# Patient Record
Sex: FEMALE | Race: OTHER | Employment: UNEMPLOYED | URBAN - METROPOLITAN AREA
[De-identification: names, ages, dates, MRNs, and addresses within clinical notes are randomized per-mention and may not be internally consistent; named-entity substitution may affect disease eponyms.]

---

## 2017-04-20 ENCOUNTER — HOSPITAL ENCOUNTER (OUTPATIENT)
Dept: WOMENS IMAGING | Age: 42
Discharge: HOME | End: 2017-04-20

## 2017-04-20 DIAGNOSIS — Z13.9 SCREENING: ICD-10-CM

## 2017-04-20 PROCEDURE — G0202 SCR MAMMO BI INCL CAD: HCPCS

## 2018-05-16 ENCOUNTER — HOSPITAL ENCOUNTER (OUTPATIENT)
Dept: WOMENS IMAGING | Age: 43
Discharge: HOME | End: 2018-05-18

## 2018-05-16 DIAGNOSIS — Z12.31 VISIT FOR SCREENING MAMMOGRAM: ICD-10-CM

## 2018-05-16 PROCEDURE — 77067 SCR MAMMO BI INCL CAD: CPT

## 2021-12-31 PROCEDURE — 96372 THER/PROPH/DIAG INJ SC/IM: CPT

## 2021-12-31 PROCEDURE — 99283 EMERGENCY DEPT VISIT LOW MDM: CPT

## 2021-12-31 PROCEDURE — 93005 ELECTROCARDIOGRAM TRACING: CPT | Performed by: EMERGENCY MEDICINE

## 2021-12-31 ASSESSMENT — PAIN SCALES - GENERAL: PAINLEVEL_OUTOF10: 8

## 2021-12-31 ASSESSMENT — PAIN DESCRIPTION - ORIENTATION: ORIENTATION: RIGHT

## 2021-12-31 ASSESSMENT — PAIN DESCRIPTION - PAIN TYPE: TYPE: ACUTE PAIN

## 2022-01-01 ENCOUNTER — APPOINTMENT (OUTPATIENT)
Dept: GENERAL RADIOLOGY | Age: 47
End: 2022-01-01
Payer: COMMERCIAL

## 2022-01-01 ENCOUNTER — HOSPITAL ENCOUNTER (EMERGENCY)
Age: 47
Discharge: HOME | End: 2022-01-01
Attending: EMERGENCY MEDICINE
Payer: COMMERCIAL

## 2022-01-01 VITALS
HEIGHT: 64 IN | TEMPERATURE: 97.3 F | HEART RATE: 72 BPM | WEIGHT: 155 LBS | DIASTOLIC BLOOD PRESSURE: 77 MMHG | SYSTOLIC BLOOD PRESSURE: 125 MMHG | RESPIRATION RATE: 16 BRPM | BODY MASS INDEX: 26.46 KG/M2 | OXYGEN SATURATION: 100 %

## 2022-01-01 DIAGNOSIS — U07.1 COVID: Primary | ICD-10-CM

## 2022-01-01 PROCEDURE — 6360000002 HC RX W HCPCS: Performed by: EMERGENCY MEDICINE

## 2022-01-01 PROCEDURE — 6370000000 HC RX 637 (ALT 250 FOR IP): Performed by: EMERGENCY MEDICINE

## 2022-01-01 PROCEDURE — 71045 X-RAY EXAM CHEST 1 VIEW: CPT

## 2022-01-01 RX ORDER — BENZONATATE 100 MG/1
100 CAPSULE ORAL ONCE
Status: COMPLETED | OUTPATIENT
Start: 2022-01-01 | End: 2022-01-01

## 2022-01-01 RX ORDER — BENZONATATE 100 MG/1
100 CAPSULE ORAL 2 TIMES DAILY PRN
Qty: 20 CAPSULE | Refills: 0 | Status: SHIPPED | OUTPATIENT
Start: 2022-01-01 | End: 2022-01-08

## 2022-01-01 RX ORDER — KETOROLAC TROMETHAMINE 30 MG/ML
30 INJECTION, SOLUTION INTRAMUSCULAR; INTRAVENOUS ONCE
Status: COMPLETED | OUTPATIENT
Start: 2022-01-01 | End: 2022-01-01

## 2022-01-01 RX ORDER — FLUTICASONE PROPIONATE 50 MCG
1 SPRAY, SUSPENSION (ML) NASAL DAILY
Qty: 16 G | Refills: 0 | Status: SHIPPED | OUTPATIENT
Start: 2022-01-01 | End: 2022-07-12

## 2022-01-01 RX ADMIN — KETOROLAC TROMETHAMINE 30 MG: 30 INJECTION, SOLUTION INTRAMUSCULAR; INTRAVENOUS at 01:48

## 2022-01-01 RX ADMIN — BENZONATATE 100 MG: 100 CAPSULE ORAL at 01:47

## 2022-01-01 ASSESSMENT — ENCOUNTER SYMPTOMS
VOMITING: 0
DIARRHEA: 0
SHORTNESS OF BREATH: 0
BACK PAIN: 1
ABDOMINAL PAIN: 0
COUGH: 1

## 2022-01-01 ASSESSMENT — PAIN SCALES - GENERAL
PAINLEVEL_OUTOF10: 6
PAINLEVEL_OUTOF10: 6

## 2022-01-01 NOTE — ED TRIAGE NOTES
Mode of arrival (squad #, walk in, police, etc) : walk in        Chief complaint(s): Rt sided head/shoulder/arm pain and cough. Possible dental pain        Arrival Note (brief scenario, treatment PTA, etc). : Pt has bad tooth and now has pain into RT shoulder and head from tooth and cough from covid        C= \"Have you ever felt that you should Cut down on your drinking? \"no  A= \"Have people Annoyed you by criticizing your drinking? \" no  G= \"Have you ever felt bad or Guilty about your drinking? \" no  E= \"Have you ever had a drink as an Eye-opener first thing in the morning to steady your nerves or to help a hangover? \" no    Deferred []      Reason for deferring: na     *If yes to two or more: probable alcohol abuse. *

## 2022-01-01 NOTE — ED PROVIDER NOTES
EMERGENCY DEPARTMENT ENCOUNTER    Pt Name: Palma Morrow  MRN: [id number]  [place] 1975  Date of evaluation: 1/1/22  CHIEF COMPLAINT       Chief Complaint   Patient presents with    Positive For Covid-19     HISTORY OF PRESENT ILLNESS   HPI     This is a 76-year-old female comes in today. The patient understands most English however her daughter helped with communication patient is [place]olan-speaking. She tested positive for coronavirus on 16 December she has had persistent cough she is now complaining of right-sided ear pain as well as shoulder pain generalized body aches and cough without sputum production she denies any chest pain no shortness of breath she has been alternating Tylenol Motrin without any improvement of her symptoms she also is supposed to see the dentist in about a week and is having right-sided jaw and tooth pain. REVIEW OF SYSTEMS     Review of Systems   Constitutional: Negative for fever. HENT: Positive for congestion and dental problem. Respiratory: Positive for cough. Negative for shortness of breath. Cardiovascular: Negative for chest pain. Gastrointestinal: Negative for abdominal pain, diarrhea and vomiting. Genitourinary: Negative for dysuria. Musculoskeletal: Positive for back pain and myalgias. Skin: Negative for rash. Neurological: Negative for headaches. All other systems reviewed and are negative. PASTMEDICAL HISTORY   History reviewed. No pertinent past medical history. SURGICAL HISTORY     History reviewed. No pertinent surgical history. CURRENT MEDICATIONS       Previous Medications    ONDANSETRON (ZOFRAN) 4 MG TABLET    Take 1 tablet by mouth every 8 hours as needed for Nausea     ALLERGIES     has No Known Allergies. FAMILY HISTORY     has no family status information on file.       SOCIAL HISTORY       Social History     Tobacco Use    Smoking status: Not on file    Smokeless tobacco: Not on file   Substance Use Topics    Alcohol use: Not on file    Drug use: Not on file     PHYSICAL EXAM     INITIAL VITALS: /77   Pulse 72   Temp 97.3 °F (36.3 °C) (Tympanic)   Resp 18   Ht 5' 4\" (1.626 m)   Wt 155 lb (70.3 kg)   SpO2 100%   BMI 26.61 kg/m²    Physical Exam  Vitals and nursing note reviewed. Constitutional:       General: She is not in acute distress. Appearance: She is well-developed. HENT:      Head: Normocephalic and atraumatic. Right Ear: Tympanic membrane normal.      Left Ear: Tympanic membrane normal.      Nose: Congestion present. Mouth/Throat:      Mouth: Mucous membranes are moist.      Comments: Dental caries on the right lower molar without any acute tenderness to palpation of the molars and no surrounding swelling no submandibular swelling no cervical lymphadenopathy  Eyes:      Conjunctiva/sclera: Conjunctivae normal.   Cardiovascular:      Rate and Rhythm: Normal rate and regular rhythm. Heart sounds: No murmur heard. No friction rub. Pulmonary:      Comments: Staccato cough while in the room no wheezing no rhonchi equal breath sounds bilaterally  Abdominal:      General: There is no distension. Palpations: Abdomen is soft. Tenderness: There is no abdominal tenderness. There is no right CVA tenderness, left CVA tenderness, guarding or rebound. Musculoskeletal:      Cervical back: Neck supple. Comments: No midline spinal tenderness to palpation   Skin:     General: Skin is warm and dry. Capillary Refill: Capillary refill takes less than 2 seconds. Neurological:      Mental Status: She is alert. DIAGNOSTIC RESULTS   RADIOLOGY:All plain film, CT, MRI, and formal ultrasound images (except ED bedside ultrasound) are read by the radiologist, see reports below, unless otherwisenoted in MDM or here. XR CHEST PORTABLE   Final Result   No evidence of acute cardiopulmonary disease.                EMERGENCY DEPARTMENTCOURSE:   Differential diagnosis includes viral syndrome, pneumonia, ear infection clinically the patient does not have otitis media she may be having right-sided ear pain from her nasal congestion we will give her Flonase. Looking at the teeth the patient does have some dental caries however there is no erythema there is no abscess there is no swelling she already has an appointment with her dentist in about a week do not believe she requires acute antibiotics at this time we will give her Toradol injection for her body aches and given the persistence of her symptoms we will also check a chest x-ray to rule out pneumonia her pulse ox was 100% I personally had the patient march in place and her pulse ox remained above 97%    1:32 AM EST  CXR is negative for pneumonia, she will be discharged with flonase, tessalon, and dental follow up.           Vitals:    Vitals:    21 2100   BP: 125/77   Pulse: 72   Resp: 18   Temp: 97.3 °F (36.3 °C)   TempSrc: Tympanic   SpO2: 100%   Weight: 155 lb (70.3 kg)   Height: 5' 4\" (1.626 m)       The patient was given the following medications while in the emergency department:  Orders Placed This Encounter   Medications    ketorolac (TORADOL) injection 30 mg    benzonatate (TESSALON) capsule 100 mg    fluticasone (FLONASE) 50 MCG/ACT nasal spray     Si spray by Each Nostril route daily     Dispense:  16 g     Refill:  0    benzonatate (TESSALON) 100 MG capsule     Sig: Take 1 capsule by mouth 2 times daily as needed for Cough     Dispense:  20 capsule     Refill:  0         FINAL IMPRESSION      1. COVID         DISPOSITION/PLAN   DISPOSITION Decision To Discharge 2022 01:30:06 AM      PATIENT REFERRED TO:  TASHA Montalvo CNP  [facility] [address]  [phone]    Schedule an appointment as soon as possible for a visit       [facility] ED  [place] [id number]  [phone]    If symptoms worsen    DISCHARGE MEDICATIONS:  New Prescriptions    BENZONATATE (TESSALON) 100 MG CAPSULE    Take 1 capsule by mouth 2 times daily as needed for Cough    FLUTICASONE (FLONASE) 50 MCG/ACT NASAL SPRAY    1 spray by Each Nostril route daily     Celestina Haynes MD  Attending Emergency Physician    This charting supersedes any ED resident or staff charting and was written using speech recognition Danni Lopes MD  01/01/22 [id number]

## 2022-01-03 ENCOUNTER — CARE COORDINATION (OUTPATIENT)
Dept: CARE COORDINATION | Age: 47
End: 2022-01-03

## 2022-01-03 LAB
EKG ATRIAL RATE: 75 BPM
EKG P AXIS: 20 DEGREES
EKG P-R INTERVAL: 140 MS
EKG Q-T INTERVAL: 406 MS
EKG QRS DURATION: 66 MS
EKG QTC CALCULATION (BAZETT): 453 MS
EKG R AXIS: 45 DEGREES
EKG T AXIS: 54 DEGREES
EKG VENTRICULAR RATE: 75 BPM

## 2022-01-03 NOTE — CARE COORDINATION
Patient contacted regarding recent visit for viral symptoms. Call within 2 business days of discharge: Yes    Medical Assistant contacted the patient by telephone to perform follow-up call. Verified name and  with patient as identifiers. Provided introduction to self, and reason for call due to viral symptoms of infection and/or exposure to COVID-19. Discussed COVID-19 related testing which was not done at this time. Test results were not done. Patient informed of results, if available? n/a. Patient presented to emergency department/flu clinic with complaints of viral symptoms/exposure to COVID. Patient reports symptoms are improving. Due to no new or worsening symptoms the RN CTN/ACM was not notified for escalation. This author reviewed discharge instructions, medical action plan and red flags such as increased shortness of breath, increasing fever, worsening cough or chest pain with patient who verbalized understanding. Discussed exposure protocols and quarantine with CDC Guidelines What To Do If You Are Sick    Patient was given an opportunity for questions and concerns. The patient agrees to contact their healthcare provider for questions related to their healthcare. Author provided contact information for future reference.

## 2022-06-15 ENCOUNTER — HOSPITAL ENCOUNTER (OUTPATIENT)
Dept: GENERAL RADIOLOGY | Age: 47
Discharge: HOME | End: 2022-06-17
Payer: COMMERCIAL

## 2022-06-15 ENCOUNTER — HOSPITAL ENCOUNTER (OUTPATIENT)
Age: 47
Discharge: HOME | End: 2022-06-15
Payer: COMMERCIAL

## 2022-06-15 ENCOUNTER — HOSPITAL ENCOUNTER (OUTPATIENT)
Age: 47
Discharge: HOME | End: 2022-06-17
Payer: COMMERCIAL

## 2022-06-15 DIAGNOSIS — R04.2 HEMOPTYSIS: ICD-10-CM

## 2022-06-15 LAB
ABSOLUTE EOS #: 0.2 K/UL (ref 0–0.4)
ABSOLUTE LYMPH #: 2.5 K/UL (ref 1–4.8)
ABSOLUTE MONO #: 0.5 K/UL (ref 0.1–1.3)
ALBUMIN SERPL-MCNC: 4.1 G/DL (ref 3.5–5.2)
ALP BLD-CCNC: 123 U/L (ref 35–104)
ALT SERPL-CCNC: 28 U/L (ref 5–33)
ANION GAP SERPL CALCULATED.3IONS-SCNC: 11 MMOL/L (ref 9–17)
AST SERPL-CCNC: 22 U/L
BASOPHILS # BLD: 0 % (ref 0–2)
BASOPHILS ABSOLUTE: 0 K/UL (ref 0–0.2)
BILIRUB SERPL-MCNC: 0.56 MG/DL (ref 0.3–1.2)
BILIRUBIN DIRECT: 0.12 MG/DL
BILIRUBIN, INDIRECT: 0.44 MG/DL (ref 0–1)
BUN BLDV-MCNC: 9 MG/DL (ref 6–20)
CALCIUM SERPL-MCNC: 9.3 MG/DL (ref 8.6–10.4)
CHLORIDE BLD-SCNC: 105 MMOL/L (ref 98–107)
CHOLESTEROL/HDL RATIO: 3.6
CHOLESTEROL: 188 MG/DL
CO2: 20 MMOL/L (ref 20–31)
CREAT SERPL-MCNC: 0.53 MG/DL (ref 0.5–0.9)
EOSINOPHILS RELATIVE PERCENT: 2 % (ref 0–4)
GFR AFRICAN AMERICAN: >60 ML/MIN
GFR NON-AFRICAN AMERICAN: >60 ML/MIN
GFR SERPL CREATININE-BSD FRML MDRD: NORMAL ML/MIN/{1.73_M2}
GLUCOSE BLD-MCNC: 99 MG/DL (ref 70–99)
HCT VFR BLD CALC: 42.4 % (ref 36–46)
HDLC SERPL-MCNC: 52 MG/DL
HEMOGLOBIN: 14.5 G/DL (ref 12–16)
LDL CHOLESTEROL: 118 MG/DL (ref 0–130)
LYMPHOCYTES # BLD: 35 % (ref 24–44)
MCH RBC QN AUTO: 30.7 PG (ref 26–34)
MCHC RBC AUTO-ENTMCNC: 34.3 G/DL (ref 31–37)
MCV RBC AUTO: 89.5 FL (ref 80–100)
MONOCYTES # BLD: 7 % (ref 1–7)
PDW BLD-RTO: 13.4 % (ref 11.5–14.9)
PLATELET # BLD: 271 K/UL (ref 150–450)
PMV BLD AUTO: 8.1 FL (ref 6–12)
POTASSIUM SERPL-SCNC: 4 MMOL/L (ref 3.7–5.3)
RBC # BLD: 4.73 M/UL (ref 4–5.2)
SEG NEUTROPHILS: 56 % (ref 36–66)
SEGMENTED NEUTROPHILS ABSOLUTE COUNT: 3.9 K/UL (ref 1.3–9.1)
SODIUM BLD-SCNC: 136 MMOL/L (ref 135–144)
TOTAL PROTEIN: 7.8 G/DL (ref 6.4–8.3)
TRIGL SERPL-MCNC: 91 MG/DL
TROPONIN, HIGH SENSITIVITY: <6 NG/L (ref 0–14)
WBC # BLD: 7.2 K/UL (ref 3.5–11)

## 2022-06-15 PROCEDURE — 80048 BASIC METABOLIC PNL TOTAL CA: CPT

## 2022-06-15 PROCEDURE — 85025 COMPLETE CBC W/AUTO DIFF WBC: CPT

## 2022-06-15 PROCEDURE — 84484 ASSAY OF TROPONIN QUANT: CPT

## 2022-06-15 PROCEDURE — 36415 COLL VENOUS BLD VENIPUNCTURE: CPT

## 2022-06-15 PROCEDURE — 80061 LIPID PANEL: CPT

## 2022-06-15 PROCEDURE — 71046 X-RAY EXAM CHEST 2 VIEWS: CPT

## 2022-06-15 PROCEDURE — 80076 HEPATIC FUNCTION PANEL: CPT

## 2022-07-12 ENCOUNTER — APPOINTMENT (OUTPATIENT)
Dept: CT IMAGING | Age: 47
End: 2022-07-12
Payer: COMMERCIAL

## 2022-07-12 ENCOUNTER — HOSPITAL ENCOUNTER (OUTPATIENT)
Age: 47
Setting detail: OBSERVATION
Discharge: HOME | End: 2022-07-13
Attending: STUDENT IN AN ORGANIZED HEALTH CARE EDUCATION/TRAINING PROGRAM | Admitting: INTERNAL MEDICINE
Payer: COMMERCIAL

## 2022-07-12 DIAGNOSIS — R07.9 CHEST PAIN, UNSPECIFIED TYPE: Primary | ICD-10-CM

## 2022-07-12 PROBLEM — E66.9 OBESITY (BMI 30.0-34.9): Status: ACTIVE | Noted: 2022-07-12

## 2022-07-12 PROBLEM — E78.00 HYPERCHOLESTEROLEMIA: Status: ACTIVE | Noted: 2022-06-13

## 2022-07-12 PROBLEM — F32.9 REACTIVE DEPRESSION: Status: ACTIVE | Noted: 2022-06-13

## 2022-07-12 PROBLEM — R20.0 LEFT ARM NUMBNESS: Status: ACTIVE | Noted: 2022-07-12

## 2022-07-12 PROBLEM — F41.9 ANXIETY: Status: ACTIVE | Noted: 2018-02-05

## 2022-07-12 LAB
ABSOLUTE EOS #: 0.1 K/UL (ref 0–0.4)
ABSOLUTE LYMPH #: 2.1 K/UL (ref 1–4.8)
ABSOLUTE MONO #: 0.6 K/UL (ref 0.1–1.3)
ALBUMIN SERPL-MCNC: 3.9 G/DL (ref 3.5–5.2)
ALP BLD-CCNC: 127 U/L (ref 35–104)
ALT SERPL-CCNC: 28 U/L (ref 5–33)
ANION GAP SERPL CALCULATED.3IONS-SCNC: 9 MMOL/L (ref 9–17)
AST SERPL-CCNC: 23 U/L
BASOPHILS # BLD: 0 % (ref 0–2)
BASOPHILS ABSOLUTE: 0 K/UL (ref 0–0.2)
BILIRUB SERPL-MCNC: 0.55 MG/DL (ref 0.3–1.2)
BUN BLDV-MCNC: 8 MG/DL (ref 6–20)
CALCIUM SERPL-MCNC: 8.9 MG/DL (ref 8.6–10.4)
CHLORIDE BLD-SCNC: 110 MMOL/L (ref 98–107)
CO2: 21 MMOL/L (ref 20–31)
CREAT SERPL-MCNC: <0.4 MG/DL (ref 0.5–0.9)
EOSINOPHILS RELATIVE PERCENT: 1 % (ref 0–4)
GFR AFRICAN AMERICAN: ABNORMAL ML/MIN
GFR NON-AFRICAN AMERICAN: ABNORMAL ML/MIN
GFR SERPL CREATININE-BSD FRML MDRD: ABNORMAL ML/MIN/{1.73_M2}
GLUCOSE BLD-MCNC: 103 MG/DL (ref 70–99)
HCG QUALITATIVE: NEGATIVE
HCT VFR BLD CALC: 40.9 % (ref 36–46)
HEMOGLOBIN: 14.1 G/DL (ref 12–16)
INFLUENZA A: NOT DETECTED
INFLUENZA B: NOT DETECTED
LIPASE: 14 U/L (ref 13–60)
LYMPHOCYTES # BLD: 32 % (ref 24–44)
MCH RBC QN AUTO: 30.8 PG (ref 26–34)
MCHC RBC AUTO-ENTMCNC: 34.6 G/DL (ref 31–37)
MCV RBC AUTO: 89.1 FL (ref 80–100)
MONOCYTES # BLD: 9 % (ref 1–7)
PDW BLD-RTO: 14 % (ref 11.5–14.9)
PLATELET # BLD: 263 K/UL (ref 150–450)
PMV BLD AUTO: 8.2 FL (ref 6–12)
POTASSIUM SERPL-SCNC: 3.7 MMOL/L (ref 3.7–5.3)
RBC # BLD: 4.59 M/UL (ref 4–5.2)
SARS-COV-2 RNA, RT PCR: NOT DETECTED
SEG NEUTROPHILS: 58 % (ref 36–66)
SEGMENTED NEUTROPHILS ABSOLUTE COUNT: 3.9 K/UL (ref 1.3–9.1)
SODIUM BLD-SCNC: 140 MMOL/L (ref 135–144)
SOURCE: NORMAL
SPECIMEN DESCRIPTION: NORMAL
TOTAL PROTEIN: 7.2 G/DL (ref 6.4–8.3)
TROPONIN, HIGH SENSITIVITY: <6 NG/L (ref 0–14)
TROPONIN, HIGH SENSITIVITY: <6 NG/L (ref 0–14)
WBC # BLD: 6.7 K/UL (ref 3.5–11)

## 2022-07-12 PROCEDURE — 99285 EMERGENCY DEPT VISIT HI MDM: CPT

## 2022-07-12 PROCEDURE — 36415 COLL VENOUS BLD VENIPUNCTURE: CPT

## 2022-07-12 PROCEDURE — G0378 HOSPITAL OBSERVATION PER HR: HCPCS

## 2022-07-12 PROCEDURE — 99220 PR INITIAL OBSERVATION CARE/DAY 70 MINUTES: CPT | Performed by: INTERNAL MEDICINE

## 2022-07-12 PROCEDURE — 74174 CTA ABD&PLVS W/CONTRAST: CPT

## 2022-07-12 PROCEDURE — 6360000004 HC RX CONTRAST MEDICATION: Performed by: STUDENT IN AN ORGANIZED HEALTH CARE EDUCATION/TRAINING PROGRAM

## 2022-07-12 PROCEDURE — 93005 ELECTROCARDIOGRAM TRACING: CPT | Performed by: STUDENT IN AN ORGANIZED HEALTH CARE EDUCATION/TRAINING PROGRAM

## 2022-07-12 PROCEDURE — 2580000003 HC RX 258: Performed by: STUDENT IN AN ORGANIZED HEALTH CARE EDUCATION/TRAINING PROGRAM

## 2022-07-12 PROCEDURE — 85025 COMPLETE CBC W/AUTO DIFF WBC: CPT

## 2022-07-12 PROCEDURE — 87636 SARSCOV2 & INF A&B AMP PRB: CPT

## 2022-07-12 PROCEDURE — 70450 CT HEAD/BRAIN W/O DYE: CPT

## 2022-07-12 PROCEDURE — 80053 COMPREHEN METABOLIC PANEL: CPT

## 2022-07-12 PROCEDURE — 84484 ASSAY OF TROPONIN QUANT: CPT

## 2022-07-12 PROCEDURE — 70498 CT ANGIOGRAPHY NECK: CPT

## 2022-07-12 PROCEDURE — 6360000002 HC RX W HCPCS: Performed by: INTERNAL MEDICINE

## 2022-07-12 PROCEDURE — 84703 CHORIONIC GONADOTROPIN ASSAY: CPT

## 2022-07-12 PROCEDURE — 96372 THER/PROPH/DIAG INJ SC/IM: CPT

## 2022-07-12 PROCEDURE — 83690 ASSAY OF LIPASE: CPT

## 2022-07-12 PROCEDURE — 96360 HYDRATION IV INFUSION INIT: CPT

## 2022-07-12 PROCEDURE — 96361 HYDRATE IV INFUSION ADD-ON: CPT

## 2022-07-12 RX ORDER — ALBUTEROL SULFATE 90 UG/1
2 AEROSOL, METERED RESPIRATORY (INHALATION) PRN
Status: ACTIVE | OUTPATIENT
Start: 2022-07-12 | End: 2022-07-13

## 2022-07-12 RX ORDER — 0.9 % SODIUM CHLORIDE 0.9 %
80 INTRAVENOUS SOLUTION INTRAVENOUS ONCE
Status: COMPLETED | OUTPATIENT
Start: 2022-07-12 | End: 2022-07-12

## 2022-07-12 RX ORDER — METOPROLOL TARTRATE 5 MG/5ML
5 INJECTION INTRAVENOUS EVERY 5 MIN PRN
Status: ACTIVE | OUTPATIENT
Start: 2022-07-12 | End: 2022-07-13

## 2022-07-12 RX ORDER — OXYBUTYNIN CHLORIDE 10 MG/1
TABLET, EXTENDED RELEASE ORAL
COMMUNITY
Start: 2022-06-20

## 2022-07-12 RX ORDER — SODIUM CHLORIDE 0.9 % (FLUSH) 0.9 %
5-40 SYRINGE (ML) INJECTION PRN
Status: DISCONTINUED | OUTPATIENT
Start: 2022-07-12 | End: 2022-07-13 | Stop reason: HOSPADM

## 2022-07-12 RX ORDER — ENOXAPARIN SODIUM 100 MG/ML
40 INJECTION SUBCUTANEOUS DAILY
Status: DISCONTINUED | OUTPATIENT
Start: 2022-07-12 | End: 2022-07-13 | Stop reason: HOSPADM

## 2022-07-12 RX ORDER — SODIUM CHLORIDE 0.9 % (FLUSH) 0.9 %
10 SYRINGE (ML) INJECTION 2 TIMES DAILY
Status: DISCONTINUED | OUTPATIENT
Start: 2022-07-12 | End: 2022-07-13 | Stop reason: HOSPADM

## 2022-07-12 RX ORDER — ACETAMINOPHEN 325 MG/1
650 TABLET ORAL EVERY 4 HOURS PRN
Status: DISCONTINUED | OUTPATIENT
Start: 2022-07-12 | End: 2022-07-13 | Stop reason: HOSPADM

## 2022-07-12 RX ORDER — POTASSIUM CHLORIDE 750 MG/1
10 TABLET, EXTENDED RELEASE ORAL DAILY
COMMUNITY
Start: 2022-05-09

## 2022-07-12 RX ORDER — SODIUM CHLORIDE 0.9 % (FLUSH) 0.9 %
5-40 SYRINGE (ML) INJECTION EVERY 12 HOURS SCHEDULED
Status: DISCONTINUED | OUTPATIENT
Start: 2022-07-12 | End: 2022-07-13 | Stop reason: HOSPADM

## 2022-07-12 RX ORDER — OMEPRAZOLE 20 MG/1
20 CAPSULE, DELAYED RELEASE ORAL DAILY
COMMUNITY
Start: 2022-05-09

## 2022-07-12 RX ORDER — ATROPINE SULFATE 0.1 MG/ML
0.5 INJECTION INTRAVENOUS EVERY 5 MIN PRN
Status: ACTIVE | OUTPATIENT
Start: 2022-07-12 | End: 2022-07-13

## 2022-07-12 RX ORDER — SODIUM CHLORIDE 9 MG/ML
500 INJECTION, SOLUTION INTRAVENOUS CONTINUOUS PRN
Status: ACTIVE | OUTPATIENT
Start: 2022-07-12 | End: 2022-07-13

## 2022-07-12 RX ORDER — 0.9 % SODIUM CHLORIDE 0.9 %
80 INTRAVENOUS SOLUTION INTRAVENOUS ONCE
Status: DISCONTINUED | OUTPATIENT
Start: 2022-07-12 | End: 2022-07-13 | Stop reason: HOSPADM

## 2022-07-12 RX ORDER — ONDANSETRON 2 MG/ML
4 INJECTION INTRAMUSCULAR; INTRAVENOUS EVERY 6 HOURS PRN
Status: DISCONTINUED | OUTPATIENT
Start: 2022-07-12 | End: 2022-07-13 | Stop reason: HOSPADM

## 2022-07-12 RX ORDER — SODIUM CHLORIDE 9 MG/ML
INJECTION, SOLUTION INTRAVENOUS PRN
Status: DISCONTINUED | OUTPATIENT
Start: 2022-07-12 | End: 2022-07-13 | Stop reason: HOSPADM

## 2022-07-12 RX ORDER — FUROSEMIDE 20 MG/1
20 TABLET ORAL DAILY
COMMUNITY
Start: 2022-05-09

## 2022-07-12 RX ORDER — SODIUM CHLORIDE 0.9 % (FLUSH) 0.9 %
5-40 SYRINGE (ML) INJECTION PRN
Status: ACTIVE | OUTPATIENT
Start: 2022-07-12 | End: 2022-07-13

## 2022-07-12 RX ORDER — ASPIRIN 81 MG/1
81 TABLET, CHEWABLE ORAL DAILY
Status: DISCONTINUED | OUTPATIENT
Start: 2022-07-13 | End: 2022-07-13 | Stop reason: HOSPADM

## 2022-07-12 RX ORDER — OXYBUTYNIN CHLORIDE 10 MG/1
10 TABLET, EXTENDED RELEASE ORAL DAILY
Status: DISCONTINUED | OUTPATIENT
Start: 2022-07-13 | End: 2022-07-13 | Stop reason: HOSPADM

## 2022-07-12 RX ORDER — ONDANSETRON 4 MG/1
4 TABLET, ORALLY DISINTEGRATING ORAL EVERY 8 HOURS PRN
Status: DISCONTINUED | OUTPATIENT
Start: 2022-07-12 | End: 2022-07-13 | Stop reason: HOSPADM

## 2022-07-12 RX ORDER — NITROGLYCERIN 0.4 MG/1
0.4 TABLET SUBLINGUAL EVERY 5 MIN PRN
Status: ACTIVE | OUTPATIENT
Start: 2022-07-12 | End: 2022-07-13

## 2022-07-12 RX ADMIN — SODIUM CHLORIDE, PRESERVATIVE FREE 10 ML: 5 INJECTION INTRAVENOUS at 15:21

## 2022-07-12 RX ADMIN — IOPAMIDOL 75 ML: 755 INJECTION, SOLUTION INTRAVENOUS at 15:24

## 2022-07-12 RX ADMIN — SODIUM CHLORIDE 80 ML: 9 INJECTION, SOLUTION INTRAVENOUS at 15:22

## 2022-07-12 RX ADMIN — SODIUM CHLORIDE, PRESERVATIVE FREE 10 ML: 5 INJECTION INTRAVENOUS at 15:24

## 2022-07-12 RX ADMIN — ENOXAPARIN SODIUM 40 MG: 40 INJECTION SUBCUTANEOUS at 20:21

## 2022-07-12 RX ADMIN — IOPAMIDOL 75 ML: 755 INJECTION, SOLUTION INTRAVENOUS at 15:20

## 2022-07-12 RX ADMIN — SODIUM CHLORIDE, PRESERVATIVE FREE 10 ML: 5 INJECTION INTRAVENOUS at 20:21

## 2022-07-12 ASSESSMENT — ENCOUNTER SYMPTOMS
NAUSEA: 1
NAUSEA: 0
COLOR CHANGE: 0
RHINORRHEA: 0
COUGH: 0
VOMITING: 0
EYE ITCHING: 0
SHORTNESS OF BREATH: 1
WHEEZING: 0
ABDOMINAL PAIN: 0
FACIAL SWELLING: 0
BACK PAIN: 0
SORE THROAT: 0
CONSTIPATION: 0
DIARRHEA: 0
PHOTOPHOBIA: 0

## 2022-07-12 ASSESSMENT — PAIN SCALES - GENERAL: PAINLEVEL_OUTOF10: 3

## 2022-07-12 ASSESSMENT — PAIN - FUNCTIONAL ASSESSMENT: PAIN_FUNCTIONAL_ASSESSMENT: NONE - DENIES PAIN

## 2022-07-12 ASSESSMENT — PAIN DESCRIPTION - LOCATION: LOCATION: NECK

## 2022-07-12 NOTE — PROGRESS NOTES
Medication History completed:    New medications: None     Medications discontinued: Flonase, lasix, omeprazole, ondansetron, potassium chloride    Changes to dosing: None    Stated allergies: Egg and milk (hives)     Other pertinent information: Confirmed the medication list with the patient. Patient reports that she never use the flonase. She is out of refills for lasix and potassium chloride about a week ago so she hasn't been taking them. She also stops omeprazole for 2 weeks now. Patient denies other OTC, herbal or supplement products. Denies illicit drugs use.      Thank you,  Nabeel Nava  PharmD candidate [id number]

## 2022-07-12 NOTE — ED NOTES
Report given to  GETACHEW Stone from [place] [address]. Report method by phone   The following was reviewed with receiving RN:   Current vital signs:  /81   Pulse 58   Temp 97.9 °F (36.6 °C) (Oral)   Resp 21   Ht 5' 4\" (1.626 m)   Wt 190 lb (86.2 kg)   SpO2 99%   BMI 32.61 kg/m²                MEWS Score: 1     Any medication or safety alerts were reviewed. Any pending diagnostics and notifications were also reviewed, as well as any safety concerns or issues, abnormal labs, abnormal imaging, and abnormal assessment findings. Questions were answered.             Scot Larry RN  07/12/22 [id number]

## 2022-07-12 NOTE — ED NOTES
Mode of arrival (squad #, walk in, police, etc) : walk in        Chief complaint(s): chest pain, headache, left arm pain, left leg pain        Arrival Note (brief scenario, treatment PTA, etc). : patient reports chest pain started around 8pm last night with difficulty breathing, at 4am pain woke her from her sleep much worse than when she went to sleep. Pain started gradually, denies cough or fever. Describes the pain as pressure and stinging in midchest. Pain and numbness in the left arm. Left jaw feels like it's tingling. Patient is alert and oriented x4.  used. C= \"Have you ever felt that you should Cut down on your drinking? \"  No  A= \"Have people Annoyed you by criticizing your drinking? \"  No  G= \"Have you ever felt bad or Guilty about your drinking? \"  No  E= \"Have you ever had a drink as an Eye-opener first thing in the morning to steady your nerves or to help a hangover? \"  No      Deferred []      Reason for deferring: N/A    *If yes to two or more: probable alcohol abuse. Candy Adjutant, GETACHEW  07/12/22 [id number]

## 2022-07-12 NOTE — H&P
[address] [facility]     HISTORY AND PHYSICAL EXAMINATION            Date:   7/13/2022  Patientname:  Kentrell Velasquez  Date of admission:  7/12/2022 12:57 PM  MRN:   [id number]  Account:  [de-identified]  YOB: 1975  PCP:    Rosa First, APRN - CNP  Room:   2061/2061-01  Code Status:    Full Code    CHIEF COMPLAINT     Chief Complaint   Patient presents with    Chest Pain    Arm Pain    Headache    Nausea    Leg Pain       HISTORY OF PRESENT ILLNESS  (Character, Onset, Location, Duration,  Exacerbating/RelievingFactors, Radiation,   Associated Symptoms, Severity )      The patient is a 48 y.o.  female, with a history of anxiety, hypercholesterolemia, and reactive depression, who presents with chest pain, arm pain, headache, nausea and leg pain. Patient speaks Kazakh as her primary language - Interpretive services used throughout H&P process. According to patient, she was woken from a sound sleep this morning with left-sided chest pain and numbness and tingling in her left arm. Patient reports that she had a similar episode about 1 month ago; however, at that time the pain was in the center of her chest.  Patient describes the pain as a constant pressure sensation that waxes and wanes, but never completely goes away. Symptoms are associated with shortness of breath, nausea, and dizziness/lightheadedness. Additionally, patient reports that she had pain up into her left neck and in her left jaw, which she felt inside of her mouth. Denies fever, chills, cough, abdominal pain, vomiting, diarrhea, and urinary symptoms. There are no aggravating or alleviating factors. Symptoms are reported as constant and moderate. PAST MEDICAL HISTORY   Patient  has no past medical history on file. PAST SURGICAL HISTORY    Patient  has no past surgical history on file. FAMILY HISTORY    Patient family history is not on file.     SOCIAL HISTORY    Patient  reports that she has never smoked. She does not have any smokeless tobacco history on file. She reports that she does not drink alcohol and does not use drugs. HOME MEDICATIONS        Prior to Admission medications    Medication Sig Start Date End Date Taking? Authorizing Provider   sertraline (ZOLOFT) 50 MG tablet Take 50 mg by mouth daily 6/13/22  Yes Historical Provider, MD   furosemide (LASIX) 20 MG tablet Take 20 mg by mouth daily 5/9/22  Yes Historical Provider, MD   oxybutynin (DITROPAN-XL) 10 MG extended release tablet TAKE 1 TABLET BY MOUTH IN THE MORNING 6/20/22  Yes Historical Provider, MD   omeprazole (PRILOSEC) 20 MG delayed release capsule Take 20 mg by mouth daily 5/9/22  Yes Historical Provider, MD   potassium chloride (KLOR-CON M) 10 MEQ extended release tablet Take 10 mEq by mouth daily 5/9/22  Yes Historical Provider, MD       ALLERGIES      Albumen, egg and Lac bovis    REVIEW OF SYSTEMS     Review of Systems   Constitutional: Negative for chills, diaphoresis and fever. HENT: Negative for congestion and sore throat. Pain in left jaw   Respiratory: Positive for shortness of breath. Negative for cough and wheezing. Cardiovascular: Positive for chest pain. Negative for palpitations and leg swelling. Gastrointestinal: Positive for nausea. Negative for abdominal pain, constipation, diarrhea and vomiting. Genitourinary: Negative for dysuria, frequency and urgency. Musculoskeletal: Negative for back pain and myalgias. Skin: Negative for rash. Neurological: Positive for dizziness, light-headedness and numbness (numbness and tingling down left arm). Negative for weakness and headaches. Psychiatric/Behavioral: The patient is not nervous/anxious. PHYSICAL EXAM      /75   Pulse 68   Temp 97.7 °F (36.5 °C) (Oral)   Resp 20   Ht 5' 4\" (1.626 m)   Wt 190 lb (86.2 kg)   SpO2 95%   BMI 32.61 kg/m²  Body mass index is 32.61 kg/m².       Physical Exam  Constitutional:       General: She is not in acute distress. Appearance: She is well-developed. She is not diaphoretic. HENT:      Head: Normocephalic and atraumatic. Eyes:      Conjunctiva/sclera: Conjunctivae normal.      Pupils: Pupils are equal, round, and reactive to light. Neck:      Trachea: No tracheal deviation. Cardiovascular:      Rate and Rhythm: Normal rate and regular rhythm. Heart sounds: Normal heart sounds. No murmur heard. No friction rub. No gallop. Pulmonary:      Effort: Pulmonary effort is normal. No respiratory distress. Breath sounds: Normal breath sounds. No wheezing or rales. Chest:      Chest wall: No tenderness. Abdominal:      General: Bowel sounds are normal. There is no distension. Palpations: Abdomen is soft. Tenderness: There is no abdominal tenderness. There is no guarding. Musculoskeletal:         General: No tenderness. Normal range of motion. Cervical back: Normal range of motion and neck supple. Lymphadenopathy:      Cervical: No cervical adenopathy. Skin:     General: Skin is warm and dry. Coloration: Skin is not pale. Findings: No erythema or rash. Neurological:      Mental Status: She is alert and oriented to person, place, and time. Motor: No seizure activity. Coordination: Coordination normal.   Psychiatric:         Behavior: Behavior normal.         Thought Content:  Thought content normal.       DIAGNOSTICS      EKG:   EKG 12 Lead [[id number]]    Collected: 07/12/22 1303    Updated: 07/12/22 1652     Ventricular Rate 69 BPM    Atrial Rate 69 BPM    QRS Duration 80 ms    Q-T Interval 432 ms    QTc Calculation (Bazett) 462 ms    R Axis 35 degrees    T Axis 48 degrees   Narrative:     Accelerated Junctional rhythm   Abnormal ECG   When compared with ECG of 31-DEC-2021 21:13,   Junctional rhythm has replaced Sinus rhythm     EKG 12 Lead [[id number]]    Collected: 07/12/22 1630    Updated: 07/12/22 1652     Ventricular Rate 57 BPM    Atrial 07/12/22  1342   COVID19 Not Detected     Imaging/Diagonstics:     XR CHEST (2 VW)    Result Date: 6/15/2022  EXAMINATION: TWO XRAY VIEWS OF THE CHEST 6/15/2022 11:04 am COMPARISON: Frontal view of the chest January 1, 2022. HISTORY: ORDERING SYSTEM PROVIDED HISTORY: Hemoptysis TECHNOLOGIST PROVIDED HISTORY: Reason for Exam: chest pain that radiates down left arm and up left side of neck FINDINGS: The heart is normal in size. No evidence of pneumothorax, pleural effusion, infiltrate, or abnormal lung mass. Osseous structures are unremarkable in appearance. Central pulmonary vascularity appears normal.  Surgical clips are present in the region of the gallbladder fossa. Unremarkable PA and lateral chest.  No evidence of acute cardiopulmonary process. CT Head W/O Contrast    Result Date: 7/12/2022  EXAMINATION: CT OF THE HEAD WITHOUT CONTRAST  7/12/2022 3:10 pm TECHNIQUE: CT of the head was performed without the administration of intravenous contrast. Automated exposure control, iterative reconstruction, and/or weight based adjustment of the mA/kV was utilized to reduce the radiation dose to as low as reasonably achievable. COMPARISON: None HISTORY: ORDERING SYSTEM PROVIDED HISTORY: left sided tingling TECHNOLOGIST PROVIDED HISTORY: left sided tingling Decision Support Exception - unselect if not a suspected or confirmed emergency medical condition->Emergency Medical Condition ([place]) Is the patient pregnant?->No Reason for Exam: left sided tingling Additional signs and symptoms: Pt c/o chest pain with nausea and arm tingling FINDINGS: BRAIN/VENTRICLES: There is no acute intracranial hemorrhage, mass effect or midline shift. No abnormal extra-axial fluid collection. The gray-white differentiation is maintained. There is no evidence of hydrocephalus. ORBITS: The visualized portion of the orbits demonstrate no acute abnormality.  SINUSES: The visualized paranasal sinuses and mastoid air cells demonstrate no appearance. No dissection or significant atherosclerosis. The mesenteric and renal arteries are widely patent. Organs: 1.7 cm cyst in the left hepatic lobe. The liver is otherwise unremarkable. Status post cholecystectomy. No biliary ductal dilatation. The pancreas, spleen, and bilateral adrenal glands are unremarkable. Bilateral simple appearing renal cysts measuring up to 1.8 cm. 1.1 cm indeterminate cystic lesion in the right kidney measuring mildly hyperdense to water. No obstructive uropathy identified. GI/Bowel: Normal appendix. The colon is unremarkable. The stomach and small bowel are normal in appearance. No obstruction or wall thickening identified. Peritoneum/Retroperitoneum: No retroperitoneal or mesenteric lymphadenopathy is identified. No free air or fluid is seen in the abdomen. Bones/Soft Tissues: No acute osseous or soft tissue abnormality. CTA PELVIS: Aorta/Iliacs: The bilateral iliac and visualized femoral arteries are normal in appearance. Other: The urinary bladder is normal in appearance. The uterus and right adnexa are unremarkable. 3 cm left adnexal cystic lesion with a fluid fluid level likely representing a hemorrhagic cyst.  No free fluid in the pelvis. No pelvic or inguinal lymphadenopathy. Bones/Soft Tissues: No acute osseous or soft tissue abnormality. 1. No aortic dissection or other acute abnormality in the chest, abdomen, or pelvis. 2. 1.1 cm indeterminate right renal cystic lesion measuring mildly hyperdense to water likely representing a proteinaceous cyst.  Consider further evaluation with a nonemergent renal protocol MRI or CT. 3. 3 cm likely hemorrhagic left ovarian cyst.  No follow-up recommended. RECOMMENDATIONS: 3 cm left adnexal hemorrhagic cyst. No follow-up imaging is recommended.  Reference: Radiology 2010 Sep;256(3):793-54     CTA HEAD NECK W CONTRAST    Result Date: 7/12/2022  EXAMINATION: CTA OF THE HEAD AND NECK WITH CONTRAST 7/12/2022 3:19 pm: TECHNIQUE: CTA of the head and neck was performed with the administration of intravenous contrast. Multiplanar reformatted images are provided for review. MIP images are provided for review. Stenosis of the internal carotid arteries measured using NASCET criteria. Automated exposure control, iterative reconstruction, and/or weight based adjustment of the mA/kV was utilized to reduce the radiation dose to as low as reasonably achievable. COMPARISON: Concurrent head CT HISTORY: ORDERING SYSTEM PROVIDED HISTORY: left sided tingling FINDINGS: CTA NECK: AORTIC ARCH/ARCH VESSELS: No dissection or arterial injury. No significant stenosis of the brachiocephalic or subclavian arteries. CAROTID ARTERIES: No dissection, arterial injury, or hemodynamically significant stenosis by NASCET criteria. VERTEBRAL ARTERIES: No dissection, arterial injury, or significant stenosis. SOFT TISSUES: The lung apices are clear. No cervical or superior mediastinal lymphadenopathy. The larynx and pharynx are unremarkable. No acute abnormality of the salivary and thyroid glands. BONES: No acute osseous abnormality. CTA HEAD: ANTERIOR CIRCULATION: No significant stenosis of the intracranial internal carotid, anterior cerebral, or middle cerebral arteries. Tiny 2 mm infundibulum versus aneurysm involving the right supraclinoid ICA. 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 POSTERIOR CIRCULATION: No significant stenosis of the vertebral, basilar, or posterior cerebral arteries. No aneurysm. OTHER: No dural venous sinus thrombosis on this non-dedicated study. BRAIN: No mass effect or midline shift. No extra-axial fluid collection. The gray-white differentiation is maintained. 1. No hemodynamically significant stenosis within the cervical ICAs per NASCET criteria. 2. Patent cervical vertebral arteries.  3. No apparent arterial high-grade stenosis or occlusion within the intracranial circulation. 4. 2 mm laterally projectingoutpouching involving the left supraclinoid ICA concerning for saccular PCOM aneurysm. 5. Tiny 2 mm infundibulum versus aneurysm involving the right supraclinoid ICA. ASSESSMENT  and  PLAN     Principal Problem:    Obesity (BMI 30.0-34. 9)  Active Problems:    Chest pain    Anxiety    Hypercholesterolemia    Reactive depression    Left arm numbness  Resolved Problems:    * No resolved hospital problems. *    Plan:    Typical Chest Pain  -Troponin HS <6 x 2  -EKG - Sinus Bradycardia - rate 57;   --initial EKG - accelerated Junctional  -CXR - no acute abnormality  -Rapid swab negative for Covid 19 and Influenza A & B  -Stress Test in am  --NPO after midnight  -Check magnesium, BNP, TSH, Lipid panel, & HgbA1c in am  -Check 2D echocardiogram  -Pain/nausea control  -EKG PRN chest pain  -CTA chest/abdomen/pelvis in ED  -- No aortic dissection or other acute abnormality in the chest abdomen pelvis  -- 1.1 cm in determinate right renal cystic lesion  -- 3 cm likely hemorrhagic left ovarian cyst-no follow-up recommended  --- See full results above    Left arm numbness  -Patient reports that CP was accompanied by numbness and tingling in her left arm  --Now resolved   ---CT head - no intercranial abnormality  ---CT head and neck -no significant stenosis of cervical ICAs  ----Patent cervical vertebral arteries  ----No apparent atrial high-grade stenosis or occlusion of intracranial circulation  ----2 mm laterally projecting outpouching involving the left subclinoid ICA concerning for saccular PCOM aneurysm  ----Tiny 2 mm infundibulum verses aneurysm involving the right supraclinoid ICA      Consultations:     [address] MD   7/13/2022  1:59 PM    Luis Miguel Burroughs [address] [place] [address], [address] [place] [address].    Phone  and add on       I have discussed the care of Mich Yang including pertinent history and exam findings,      7/13/22   with the Dee Puga CNP  I have seen and examined the patient and the key elements of all parts of the encounter have been performed by me . I agree with the assessment, plan and orders as documented by the resident. Hospital Problems           Last Modified POA    * (Principal) Obesity (BMI 30.0-34.9) 7/12/2022 Yes    Chest pain 7/12/2022 Yes    Anxiety 7/13/2022 Yes    Hypercholesterolemia 7/13/2022 Yes    Reactive depression 7/13/2022 Yes    Left arm numbness 7/12/2022 Yes            Ct head etc and ct chest no sign pathology . Stress test results pending . Patient does have hx depression . On sertaline . No chest pain , arm numbness today   Will increase sertaline to bid . --  Vitals:    07/12/22 1944 07/12/22 2355 07/13/22 0624 07/13/22 1219   BP: 108/73 101/63 101/65 116/75   Pulse: 62 66 66 68   Resp: 19 16 16 20   Temp: 97.4 °F (36.3 °C) 97.9 °F (36.6 °C) 97.9 °F (36.6 °C) 97.7 °F (36.5 °C)   TempSrc:    Oral   SpO2: 97% 96% 97% 95%   Weight:       Height:                Medications: Allergies:     Allergies   Allergen Reactions    Albumen, Egg Other (See Comments) and Rash    Lac Bovis Other (See Comments) and Rash       Current Meds:   Scheduled Meds:    sodium chloride flush  10 mL IntraVENous BID    sodium chloride flush  10 mL IntraVENous BID    sodium chloride  80 mL IntraVENous Once    sodium chloride flush  5-40 mL IntraVENous 2 times per day    enoxaparin  40 mg SubCUTAneous Daily    sertraline  50 mg Oral Daily    oxybutynin  10 mg Oral Daily    aspirin  81 mg Oral Daily     Continuous Infusions:    sodium chloride      sodium chloride       PRN Meds: sodium chloride flush, sodium chloride, albuterol sulfate HFA, atropine, nitroGLYCERIN, metoprolol, perflutren lipid microspheres, technetium sestamibi, sodium chloride flush, sodium chloride, acetaminophen, ondansetron **OR** ondansetron      ---- ; MD MORIS Malloy [address], [address] [place] [address].    Phone [phone]   Fax: [phone]  Answering Service: [phone]

## 2022-07-12 NOTE — ED PROVIDER NOTES
EMERGENCY DEPARTMENT ENCOUNTER    Pt Name: Kentrell Velasquez  MRN: [id number]  [place] 1975  Date of evaluation: 7/12/22  CHIEF COMPLAINT       Chief Complaint   Patient presents with    Chest Pain    Arm Pain    Headache    Nausea    Leg Pain     HISTORY OF PRESENT ILLNESS   HPI  78-year-old female previously healthy presents for evaluation of chest pain and shortness of breath. Symptoms started last night around 8 PM.  Patient describes substernal and left-sided pressure. She is having associated left arm tingling. Also reports associated left facial and left leg tingling which are now improved. Symptoms are moderate. Symptoms are intermittent. Took some unknown pills at home which improved her symptoms and she was able to sleep but then woke up around 4 AM today with persistent symptoms. Reports an isolated episode of similar symptoms about a month ago. No other recent illness. No other associated symptoms. History obtained via  [employer]â„¢     Review of Systems   Constitutional: Negative for chills and fatigue. HENT: Negative for facial swelling, postnasal drip and rhinorrhea. Eyes: Negative for photophobia and itching. Respiratory: Positive for shortness of breath. Negative for cough. Cardiovascular: Positive for chest pain. Negative for leg swelling. Gastrointestinal: Negative for abdominal pain, diarrhea, nausea and vomiting. Genitourinary: Negative for dysuria, flank pain and hematuria. Musculoskeletal: Negative for arthralgias and joint swelling. Skin: Negative for color change and rash. Neurological: Negative for dizziness, numbness and headaches. PASTMEDICAL HISTORY   History reviewed. No pertinent past medical history. Past Problem List  Patient Active Problem List   Diagnosis Code    Chest pain R07.9     SURGICAL HISTORY     History reviewed. No pertinent surgical history.   CURRENT MEDICATIONS       Current Discharge Medication List CONTINUE these medications which have NOT CHANGED    Details   sertraline (ZOLOFT) 50 MG tablet Take 50 mg by mouth daily      furosemide (LASIX) 20 MG tablet Take 20 mg by mouth daily      oxybutynin (DITROPAN-XL) 10 MG extended release tablet TAKE 1 TABLET BY MOUTH IN THE MORNING      omeprazole (PRILOSEC) 20 MG delayed release capsule Take 20 mg by mouth daily      potassium chloride (KLOR-CON M) 10 MEQ extended release tablet Take 10 mEq by mouth daily           ALLERGIES     is allergic to albumen, egg and lac bovis. FAMILY HISTORY     has no family status information on file. SOCIAL HISTORY       Social History     Tobacco Use    Smoking status: Never Smoker    Smokeless tobacco: Not on file   Substance Use Topics    Alcohol use: Never    Drug use: Never     PHYSICAL EXAM     INITIAL VITALS: /73   Pulse 62   Temp 97.4 °F (36.3 °C)   Resp 19   Ht 5' 4\" (1.626 m)   Wt 190 lb (86.2 kg)   SpO2 97%   BMI 32.61 kg/m²    Physical Exam  Vitals and nursing note reviewed. Constitutional:       Appearance: She is normal weight. HENT:      Head: Normocephalic and atraumatic. Eyes:      Extraocular Movements: Extraocular movements intact. Pupils: Pupils are equal, round, and reactive to light. Cardiovascular:      Rate and Rhythm: Normal rate and regular rhythm. Pulmonary:      Effort: Pulmonary effort is normal.      Breath sounds: Normal breath sounds. Abdominal:      General: Abdomen is flat. There is no distension. Palpations: There is no mass. Musculoskeletal:         General: No swelling. Normal range of motion. Cervical back: Normal range of motion and neck supple. Skin:     General: Skin is warm and dry. Neurological:      General: No focal deficit present. Mental Status: She is alert. Mental status is at baseline.       Comments: Normal mental status, cranial nerves II through XII intact, normal speech, normal strength and sensation bilateral upper Preliminary Result   No acute intracranial abnormality. NM Cardiac Stress Test Nuclear Imaging    (Results Pending)     LABS: All lab results were reviewed by myself, and all abnormals are listed below.   Labs Reviewed   CBC WITH AUTO DIFFERENTIAL - Abnormal; Notable for the following components:       Result Value    Monocytes 9 (*)     All other components within normal limits   COMPREHENSIVE METABOLIC PANEL - Abnormal; Notable for the following components:    Glucose 103 (*)     CREATININE <0.40 (*)     Chloride 110 (*)     Alkaline Phosphatase 127 (*)     All other components within normal limits   COVID-19 & INFLUENZA COMBO   LIPASE   TROPONIN   TROPONIN   HCG, SERUM, QUALITATIVE       EMERGENCY DEPARTMENTCOURSE:         Vitals:    Vitals:    07/12/22 1700 07/12/22 1745 07/12/22 1800 07/12/22 1944   BP: 104/67 112/67 118/81 108/73   Pulse: 58 56 58 62   Resp: 18 20 21 19   Temp:    97.4 °F (36.3 °C)   TempSrc:       SpO2: 99% 98% 99% 97%   Weight:       Height:           The patient was given the following medications while in the emergency department:  Orders Placed This Encounter   Medications    iopamidol (ISOVUE-370) 76 % injection 75 mL    sodium chloride flush 0.9 % injection 10 mL    0.9 % sodium chloride bolus    iopamidol (ISOVUE-370) 76 % injection 75 mL    sodium chloride flush 0.9 % injection 10 mL    0.9 % sodium chloride bolus    sodium chloride flush 0.9 % injection 5-40 mL    sodium chloride flush 0.9 % injection 5-40 mL    0.9 % sodium chloride infusion    enoxaparin (LOVENOX) injection 40 mg     Order Specific Question:   Indication of Use     Answer:   Prophylaxis-DVT/PE    acetaminophen (TYLENOL) tablet 650 mg    OR Linked Order Group     ondansetron (ZOFRAN-ODT) disintegrating tablet 4 mg     ondansetron (ZOFRAN) injection 4 mg    sodium chloride flush 0.9 % injection 5-40 mL    0.9 % sodium chloride infusion    atropine injection 0.5 mg    nitroGLYCERIN (NITROSTAT) SL tablet 0.4 mg    metoprolol (LOPRESSOR) injection 5 mg    albuterol sulfate HFA (PROVENTIL;VENTOLIN;PROAIR) 108 (90 Base) MCG/ACT inhaler 2 puff     Order Specific Question:   Initiate RT Bronchodilator Protocol     Answer:   No     CONSULTS:  IP CONSULT TO INTERNAL MEDICINE    FINAL IMPRESSION      1. Chest pain, unspecified type          DISPOSITION/PLAN   DISPOSITION Admitted 07/12/2022 05:49:20 PM      PATIENT REFERRED TO:  No follow-up provider specified. DISCHARGE MEDICATIONS:  Current Discharge Medication List        The care is provided during an unprecedented national emergency due to the novel coronavirus, COVID 19.   Conrad Frankel, MD Conrad Frankel, MD  07/12/22 1382

## 2022-07-13 ENCOUNTER — APPOINTMENT (OUTPATIENT)
Dept: NUCLEAR MEDICINE | Age: 47
End: 2022-07-13
Payer: COMMERCIAL

## 2022-07-13 ENCOUNTER — APPOINTMENT (OUTPATIENT)
Dept: NON INVASIVE DIAGNOSTICS | Age: 47
End: 2022-07-13
Payer: COMMERCIAL

## 2022-07-13 VITALS
DIASTOLIC BLOOD PRESSURE: 75 MMHG | OXYGEN SATURATION: 95 % | HEART RATE: 68 BPM | SYSTOLIC BLOOD PRESSURE: 116 MMHG | BODY MASS INDEX: 32.44 KG/M2 | HEIGHT: 64 IN | WEIGHT: 190 LBS | TEMPERATURE: 97.7 F | RESPIRATION RATE: 20 BRPM

## 2022-07-13 PROBLEM — Z13.6 TREADMILL STRESS TEST NEGATIVE FOR ANGINA PECTORIS: Status: ACTIVE | Noted: 2022-07-13

## 2022-07-13 LAB
ANION GAP SERPL CALCULATED.3IONS-SCNC: 8 MMOL/L (ref 9–17)
BUN BLDV-MCNC: 9 MG/DL (ref 6–20)
CALCIUM SERPL-MCNC: 8.5 MG/DL (ref 8.6–10.4)
CHLORIDE BLD-SCNC: 108 MMOL/L (ref 98–107)
CHOLESTEROL/HDL RATIO: 4.8
CHOLESTEROL: 184 MG/DL
CO2: 22 MMOL/L (ref 20–31)
CREAT SERPL-MCNC: 0.53 MG/DL (ref 0.5–0.9)
EKG ATRIAL RATE: 57 BPM
EKG ATRIAL RATE: 69 BPM
EKG P AXIS: 19 DEGREES
EKG P-R INTERVAL: 142 MS
EKG Q-T INTERVAL: 432 MS
EKG Q-T INTERVAL: 448 MS
EKG QRS DURATION: 78 MS
EKG QRS DURATION: 80 MS
EKG QTC CALCULATION (BAZETT): 436 MS
EKG QTC CALCULATION (BAZETT): 462 MS
EKG R AXIS: 15 DEGREES
EKG R AXIS: 35 DEGREES
EKG T AXIS: 30 DEGREES
EKG T AXIS: 48 DEGREES
EKG VENTRICULAR RATE: 57 BPM
EKG VENTRICULAR RATE: 69 BPM
ESTIMATED AVERAGE GLUCOSE: 91 MG/DL
GFR AFRICAN AMERICAN: >60 ML/MIN
GFR NON-AFRICAN AMERICAN: >60 ML/MIN
GFR SERPL CREATININE-BSD FRML MDRD: ABNORMAL ML/MIN/{1.73_M2}
GLUCOSE BLD-MCNC: 111 MG/DL (ref 70–99)
HBA1C MFR BLD: 4.8 % (ref 4–6)
HCT VFR BLD CALC: 37.9 % (ref 36–46)
HDLC SERPL-MCNC: 38 MG/DL
HEMOGLOBIN: 13.2 G/DL (ref 12–16)
LDL CHOLESTEROL: 109 MG/DL (ref 0–130)
LV EF: 63 %
LV EF: 70 %
LVEF MODALITY: NORMAL
LVEF MODALITY: NORMAL
MAGNESIUM: 2.2 MG/DL (ref 1.6–2.6)
MCH RBC QN AUTO: 31.4 PG (ref 26–34)
MCHC RBC AUTO-ENTMCNC: 34.9 G/DL (ref 31–37)
MCV RBC AUTO: 90.1 FL (ref 80–100)
PDW BLD-RTO: 14.1 % (ref 11.5–14.9)
PLATELET # BLD: 233 K/UL (ref 150–450)
PMV BLD AUTO: 7.5 FL (ref 6–12)
POTASSIUM SERPL-SCNC: 3.8 MMOL/L (ref 3.7–5.3)
PRO-BNP: 122 PG/ML
RBC # BLD: 4.2 M/UL (ref 4–5.2)
SODIUM BLD-SCNC: 138 MMOL/L (ref 135–144)
TRIGL SERPL-MCNC: 183 MG/DL
TSH SERPL DL<=0.05 MIU/L-ACNC: 1.26 UIU/ML (ref 0.3–5)
WBC # BLD: 5.2 K/UL (ref 3.5–11)

## 2022-07-13 PROCEDURE — 6370000000 HC RX 637 (ALT 250 FOR IP): Performed by: NURSE PRACTITIONER

## 2022-07-13 PROCEDURE — 80061 LIPID PANEL: CPT

## 2022-07-13 PROCEDURE — A9500 TC99M SESTAMIBI: HCPCS | Performed by: NURSE PRACTITIONER

## 2022-07-13 PROCEDURE — 99217 PR OBSERVATION CARE DISCHARGE MANAGEMENT: CPT | Performed by: INTERNAL MEDICINE

## 2022-07-13 PROCEDURE — 83735 ASSAY OF MAGNESIUM: CPT

## 2022-07-13 PROCEDURE — 93010 ELECTROCARDIOGRAM REPORT: CPT | Performed by: INTERNAL MEDICINE

## 2022-07-13 PROCEDURE — 83036 HEMOGLOBIN GLYCOSYLATED A1C: CPT

## 2022-07-13 PROCEDURE — 96372 THER/PROPH/DIAG INJ SC/IM: CPT

## 2022-07-13 PROCEDURE — G0378 HOSPITAL OBSERVATION PER HR: HCPCS

## 2022-07-13 PROCEDURE — 93306 TTE W/DOPPLER COMPLETE: CPT

## 2022-07-13 PROCEDURE — 85027 COMPLETE CBC AUTOMATED: CPT

## 2022-07-13 PROCEDURE — 2580000003 HC RX 258: Performed by: STUDENT IN AN ORGANIZED HEALTH CARE EDUCATION/TRAINING PROGRAM

## 2022-07-13 PROCEDURE — 78452 HT MUSCLE IMAGE SPECT MULT: CPT

## 2022-07-13 PROCEDURE — 83880 ASSAY OF NATRIURETIC PEPTIDE: CPT

## 2022-07-13 PROCEDURE — 2580000003 HC RX 258: Performed by: NURSE PRACTITIONER

## 2022-07-13 PROCEDURE — 93017 CV STRESS TEST TRACING ONLY: CPT

## 2022-07-13 PROCEDURE — 6360000002 HC RX W HCPCS: Performed by: INTERNAL MEDICINE

## 2022-07-13 PROCEDURE — 84443 ASSAY THYROID STIM HORMONE: CPT

## 2022-07-13 PROCEDURE — 36415 COLL VENOUS BLD VENIPUNCTURE: CPT

## 2022-07-13 PROCEDURE — 3430000000 HC RX DIAGNOSTIC RADIOPHARMACEUTICAL: Performed by: NURSE PRACTITIONER

## 2022-07-13 PROCEDURE — 80048 BASIC METABOLIC PNL TOTAL CA: CPT

## 2022-07-13 RX ORDER — SODIUM CHLORIDE 0.9 % (FLUSH) 0.9 %
10 SYRINGE (ML) INJECTION PRN
Status: DISCONTINUED | OUTPATIENT
Start: 2022-07-13 | End: 2022-07-13 | Stop reason: HOSPADM

## 2022-07-13 RX ORDER — SERTRALINE HYDROCHLORIDE 100 MG/1
100 TABLET, FILM COATED ORAL DAILY
Status: DISCONTINUED | OUTPATIENT
Start: 2022-07-14 | End: 2022-07-13 | Stop reason: HOSPADM

## 2022-07-13 RX ORDER — SODIUM CHLORIDE 0.9 % (FLUSH) 0.9 %
5-40 SYRINGE (ML) INJECTION PRN
Status: ACTIVE | OUTPATIENT
Start: 2022-07-13 | End: 2022-07-13

## 2022-07-13 RX ORDER — ALBUTEROL SULFATE 90 UG/1
2 AEROSOL, METERED RESPIRATORY (INHALATION) PRN
Status: ACTIVE | OUTPATIENT
Start: 2022-07-13 | End: 2022-07-13

## 2022-07-13 RX ORDER — SODIUM CHLORIDE 9 MG/ML
500 INJECTION, SOLUTION INTRAVENOUS CONTINUOUS PRN
Status: ACTIVE | OUTPATIENT
Start: 2022-07-13 | End: 2022-07-13

## 2022-07-13 RX ORDER — SERTRALINE HYDROCHLORIDE 100 MG/1
100 TABLET, FILM COATED ORAL DAILY
Qty: 30 TABLET | Refills: 3 | Status: SHIPPED | OUTPATIENT
Start: 2022-07-14

## 2022-07-13 RX ORDER — METOPROLOL TARTRATE 5 MG/5ML
5 INJECTION INTRAVENOUS EVERY 5 MIN PRN
Status: ACTIVE | OUTPATIENT
Start: 2022-07-13 | End: 2022-07-13

## 2022-07-13 RX ORDER — NITROGLYCERIN 0.4 MG/1
0.4 TABLET SUBLINGUAL EVERY 5 MIN PRN
Status: ACTIVE | OUTPATIENT
Start: 2022-07-13 | End: 2022-07-13

## 2022-07-13 RX ORDER — ATROPINE SULFATE 0.1 MG/ML
0.5 INJECTION INTRAVENOUS EVERY 5 MIN PRN
Status: ACTIVE | OUTPATIENT
Start: 2022-07-13 | End: 2022-07-13

## 2022-07-13 RX ADMIN — SODIUM CHLORIDE, PRESERVATIVE FREE 10 ML: 5 INJECTION INTRAVENOUS at 14:18

## 2022-07-13 RX ADMIN — SODIUM CHLORIDE, PRESERVATIVE FREE 10 ML: 5 INJECTION INTRAVENOUS at 12:34

## 2022-07-13 RX ADMIN — TETRAKIS(2-METHOXYISOBUTYLISOCYANIDE)COPPER(I) TETRAFLUOROBORATE 15.1 MILLICURIE: 1 INJECTION, POWDER, LYOPHILIZED, FOR SOLUTION INTRAVENOUS at 10:40

## 2022-07-13 RX ADMIN — OXYBUTYNIN CHLORIDE 10 MG: 10 TABLET, EXTENDED RELEASE ORAL at 12:32

## 2022-07-13 RX ADMIN — TETRAKIS(2-METHOXYISOBUTYLISOCYANIDE)COPPER(I) TETRAFLUOROBORATE 32.6 MILLICURIE: 1 INJECTION, POWDER, LYOPHILIZED, FOR SOLUTION INTRAVENOUS at 14:18

## 2022-07-13 RX ADMIN — ASPIRIN 81 MG: 81 TABLET, CHEWABLE ORAL at 12:32

## 2022-07-13 RX ADMIN — ENOXAPARIN SODIUM 40 MG: 40 INJECTION SUBCUTANEOUS at 12:33

## 2022-07-13 RX ADMIN — SERTRALINE HYDROCHLORIDE 50 MG: 50 TABLET ORAL at 12:33

## 2022-07-13 NOTE — PLAN OF CARE
Problem: Discharge Planning  Goal: Discharge to home or other facility with appropriate resources  Outcome: Completed     Problem: Pain  Goal: Verbalizes/displays adequate comfort level or baseline comfort level  7/13/2022 1734 by Elizabeth Simmons RN  Outcome: Completed  7/13/2022 1732 by Elizabeth Simmons RN  Outcome: Progressing  Flowsheets (Taken 7/13/2022 1732)  Verbalizes/displays adequate comfort level or baseline comfort level:   Encourage patient to monitor pain and request assistance   Assess pain using appropriate pain scale   Implement non-pharmacological measures as appropriate and evaluate response  Note: Patient had no complaints of pain during this shift.       Problem: Neurosensory - Adult  Goal: Achieves maximal functionality and self care  Outcome: Completed     Problem: Musculoskeletal - Adult  Goal: Return ADL status to a safe level of function  Outcome: Completed

## 2022-07-13 NOTE — DISCHARGE SUMMARY
[place] [address] Internal Medicine    Discharge Summary     Patient ID: Romeo Montilla  :  1975   MRN: [id number]     ACCOUNT:  [de-identified]   Patient's PCP: TASHA Sanchez CNP  Admit Date: 2022   Discharge Date: 2022    Length of Stay: 0  Code Status:  Full Code  Admitting Physician: Jose Armando Anaya MD  Discharge Physician: Jose Armando Anaya MD     Active Discharge Diagnoses:     Primary Problem  Obesity (BMI 30.0-34. 9)      Hospital Problems  Active Hospital Problems    Diagnosis Date Noted    Chest pain [R07.9] 2022     Priority: Medium    Obesity (BMI 30.0-34. 9) [E66.9] 2022     Priority: Medium    Left arm numbness [R20.0] 2022     Priority: Medium    Hypercholesterolemia [E78.00] 2022     Priority: Medium    Reactive depression [F32.9] 2022     Priority: Medium    Anxiety [F41.9] 2018     Priority: Medium       Admission Condition:  fair     Discharged Condition: fair    Hospital Stay:     Hospital Course:  Romeo Montilla is a 48 y.o. female who was admitted for the management of Obesity (BMI 30.0-34.9) , presented to ER with Chest Pain, Arm Pain, Headache, Nausea, and Leg Pain       The patient is a 48 y.o.  female, with a history of anxiety, hypercholesterolemia, and reactive depression, who presents with chest pain, arm pain, headache, nausea and leg pain. Patient speaks Malay as her primary language - Interpretive services used throughout H&P process. According to patient, she was woken from a sound sleep this morning with left-sided chest pain and numbness and tingling in her left arm. Patient reports that she had a similar episode about 1 month ago; however, at that time the pain was in the center of her chest.  Patient describes the pain as a constant pressure sensation that waxes and wanes, but never completely goes away.   Symptoms are associated with shortness of breath, nausea, and dizziness/lightheadedness. Additionally, patient reports that she had pain up into her left neck and in her left jaw, which she felt inside of her mouth. Denies fever, chills, cough, abdominal pain, vomiting, diarrhea, and urinary symptoms. There are no aggravating or alleviating factors. Symptoms are reported as constant and moderate. Impression    1. No definitive scintigraphic evidence for reversible ischemia or infarct   2. Left ventricular ejection fracture of greater than 70%. Significant therapeutic interventions:     Significant Diagnostic Studies:   Labs / Micro:        ,     Radiology:    XR CHEST (2 VW)    Result Date: 6/15/2022  EXAMINATION: TWO XRAY VIEWS OF THE CHEST 6/15/2022 11:04 am COMPARISON: Frontal view of the chest January 1, 2022. HISTORY: ORDERING SYSTEM PROVIDED HISTORY: Hemoptysis TECHNOLOGIST PROVIDED HISTORY: Reason for Exam: chest pain that radiates down left arm and up left side of neck FINDINGS: The heart is normal in size. No evidence of pneumothorax, pleural effusion, infiltrate, or abnormal lung mass. Osseous structures are unremarkable in appearance. Central pulmonary vascularity appears normal.  Surgical clips are present in the region of the gallbladder fossa. Unremarkable PA and lateral chest.  No evidence of acute cardiopulmonary process. CT Head W/O Contrast    Result Date: 7/13/2022  EXAMINATION: CT OF THE HEAD WITHOUT CONTRAST  7/12/2022 3:10 pm TECHNIQUE: CT of the head was performed without the administration of intravenous contrast. Automated exposure control, iterative reconstruction, and/or weight based adjustment of the mA/kV was utilized to reduce the radiation dose to as low as reasonably achievable.  COMPARISON: None HISTORY: ORDERING SYSTEM PROVIDED HISTORY: left sided tingling TECHNOLOGIST PROVIDED HISTORY: left sided tingling Decision Support Exception - unselect if not a suspected or confirmed emergency medical condition->Emergency Medical Condition (MA) Is the patient pregnant?->No Reason for Exam: left sided tingling Additional signs and symptoms: Pt c/o chest pain with nausea and arm tingling FINDINGS: BRAIN/VENTRICLES: There is no acute intracranial hemorrhage, mass effect or midline shift. No abnormal extra-axial fluid collection. The gray-white differentiation is maintained. There is no evidence of hydrocephalus. ORBITS: The visualized portion of the orbits demonstrate no acute abnormality. SINUSES: The visualized paranasal sinuses and mastoid air cells demonstrate no acute abnormality. SOFT TISSUES/SKULL:  No acute abnormality of the visualized skull or soft tissues. No acute intracranial abnormality. CTA CHEST ABDOMEN PELVIS W CONTRAST    Result Date: 7/12/2022  EXAMINATION: CTA OF THE CHEST, ABDOMEN AND PELVIS WITH CONTRAST 7/12/2022 3:25 pm: TECHNIQUE: CTA of the chest, abdomen and pelvis was performed after the administration of intravenous contrast.  Multiplanar reformatted images are provided for review. MIP images are provided for review. Automated exposure control, iterative reconstruction, and/or weight based adjustment of the mA/kV was utilized to reduce the radiation dose to as low as reasonably achievable. COMPARISON: CT abdomen and pelvis 10/06/2016. HISTORY: ORDERING SYSTEM PROVIDED HISTORY: chest pain, left sided tingling TECHNOLOGIST PROVIDED HISTORY: chest pain, left sided tingling Decision Support Exception - unselect if not a suspected or confirmed emergency medical condition->Emergency Medical Condition (MA) Reason for Exam: chest pain, left sided tingling Additional signs and symptoms: PTs daughter states left sided tingling, chest pain, SOB FINDINGS: CTA CHEST: Thoracic aorta: The thoracic aorta is normal in appearance. No dissection or other acute abnormality. No significant atherosclerosis.   The coronary arteries and branch vessels of the superior mediastinum and lower neck are unremarkable. Mediastinum: The main pulmonary artery is normal in caliber. No pulmonary embolism identified. The heart is normal in size. No pericardial effusion. The mediastinal esophagus and visualized aspects of the thyroid gland are unremarkable. No lymphadenopathy or pneumomediastinum. Lungs/Pleura: The central airways are patent. No pleural effusion or pneumothorax. No consolidation or interlobular septal thickening. Soft Tissues/Bones: No acute osseous or soft tissue abnormality. CTA ABDOMEN: Abdominal aorta/Branches: The abdominal aorta is normal in appearance. No dissection or significant atherosclerosis. The mesenteric and renal arteries are widely patent. Organs: 1.7 cm cyst in the left hepatic lobe. The liver is otherwise unremarkable. Status post cholecystectomy. No biliary ductal dilatation. The pancreas, spleen, and bilateral adrenal glands are unremarkable. Bilateral simple appearing renal cysts measuring up to 1.8 cm. 1.1 cm indeterminate cystic lesion in the right kidney measuring mildly hyperdense to water. No obstructive uropathy identified. GI/Bowel: Normal appendix. The colon is unremarkable. The stomach and small bowel are normal in appearance. No obstruction or wall thickening identified. Peritoneum/Retroperitoneum: No retroperitoneal or mesenteric lymphadenopathy is identified. No free air or fluid is seen in the abdomen. Bones/Soft Tissues: No acute osseous or soft tissue abnormality. CTA PELVIS: Aorta/Iliacs: The bilateral iliac and visualized femoral arteries are normal in appearance. Other: The urinary bladder is normal in appearance. The uterus and right adnexa are unremarkable. 3 cm left adnexal cystic lesion with a fluid fluid level likely representing a hemorrhagic cyst.  No free fluid in the pelvis. No pelvic or inguinal lymphadenopathy. Bones/Soft Tissues: No acute osseous or soft tissue abnormality.      1. No aortic dissection or other acute abnormality in the chest, abdomen, or pelvis. 2. 1.1 cm indeterminate right renal cystic lesion measuring mildly hyperdense to water likely representing a proteinaceous cyst.  Consider further evaluation with a nonemergent renal protocol MRI or CT. 3. 3 cm likely hemorrhagic left ovarian cyst.  No follow-up recommended. RECOMMENDATIONS: 3 cm left adnexal hemorrhagic cyst. No follow-up imaging is recommended. Reference: Radiology 2010 Sep;256(3):244-21     CTA HEAD NECK W CONTRAST    Result Date: 7/12/2022  EXAMINATION: CTA OF THE HEAD AND NECK WITH CONTRAST 7/12/2022 3:19 pm: TECHNIQUE: CTA of the head and neck was performed with the administration of intravenous contrast. Multiplanar reformatted images are provided for review. MIP images are provided for review. Stenosis of the internal carotid arteries measured using NASCET criteria. Automated exposure control, iterative reconstruction, and/or weight based adjustment of the mA/kV was utilized to reduce the radiation dose to as low as reasonably achievable. COMPARISON: Concurrent head CT HISTORY: ORDERING SYSTEM PROVIDED HISTORY: left sided tingling FINDINGS: CTA NECK: AORTIC ARCH/ARCH VESSELS: No dissection or arterial injury. No significant stenosis of the brachiocephalic or subclavian arteries. CAROTID ARTERIES: No dissection, arterial injury, or hemodynamically significant stenosis by NASCET criteria. VERTEBRAL ARTERIES: No dissection, arterial injury, or significant stenosis. SOFT TISSUES: The lung apices are clear. No cervical or superior mediastinal lymphadenopathy. The larynx and pharynx are unremarkable. No acute abnormality of the salivary and thyroid glands. BONES: No acute osseous abnormality. CTA HEAD: ANTERIOR CIRCULATION: No significant stenosis of the intracranial internal carotid, anterior cerebral, or middle cerebral arteries. Tiny 2 mm infundibulum versus aneurysm involving the right supraclinoid ICA.   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 POSTERIOR CIRCULATION: No significant stenosis of the vertebral, basilar, or posterior cerebral arteries. No aneurysm. OTHER: No dural venous sinus thrombosis on this non-dedicated study. BRAIN: No mass effect or midline shift. No extra-axial fluid collection. The gray-white differentiation is maintained. 1. No hemodynamically significant stenosis within the cervical ICAs per NASCET criteria. 2. Patent cervical vertebral arteries. 3. No apparent arterial high-grade stenosis or occlusion within the intracranial circulation. 4. 2 mm laterally projectingoutpouching involving the left supraclinoid ICA concerning for saccular PCOM aneurysm. 5. Tiny 2 mm infundibulum versus aneurysm involving the right supraclinoid ICA. NM MYOCARDIAL SPECT REST EXERCISE OR RX    Result Date: 7/13/2022  EXAMINATION: MYOCARDIAL PERFUSION IMAGING 7/13/2022 TECHNIQUE: Rest dose:  32.6 mCi Tc-99m sestamibi intravenously Stress dose:  15.1 mCi Tc-99m sestamibi intravenously Under cardiology supervision, treadmill exercise testing was performed. Peak heart rate of 155 bpm is 90% of the age predicted maximum heart rate. SPECT imaging was acquired following injection of the sestamibi. ECG gating was obtained following the stress acquisition. COMPARISON: None Available. HISTORY: ORDERING SYSTEM PROVIDED HISTORY: Chest pain TECHNOLOGIST PROVIDED HISTORY: chest pain Reason for Exam: Chest pain Procedure Type->Exercise Is the patient pregnant?->No Reason for Exam: CP 42-year-old female with chest pain FINDINGS: The patient achieved a maximum heart rate of 155 beats per minute, 90 % of the maximum age predicted heart rate of 172 beats per minute. Perfusion: There is no scintigraphic evidence for a reversible or fixed perfusion defect to suggest reversible ischemia or infarct.  Function: The gated SPECT data demonstrates normal left ventricular size and normal wall Resting perfusion abnormalities in 5%-9.9% of the myocardium in patients without a history or prior evidence of MI 3. Stress-induced perfusion abnormality encumbering 5%-9.9% of the myocardium or stress segmental scores indicating 1 vascular territory with abnormalities but without LV dilation 4. Small wall motion abnormality involving 1-2 segments and only 1 coronary bed. Low Risk (Less than 1% annual death or MI) 1. Normal or small myocardial perfusion defect at rest or with stress encumbering less than 5% of the myocardium. Consultations:    Consults:     Final Specialist Recommendations/Findings:   IP CONSULT TO INTERNAL MEDICINE      The patient was seen and examined on day of discharge and this discharge summary is in conjunction with any daily progress note from day of discharge. Discharge plan:     Disposition: Home    Physician Follow Up:     No follow-up provider specified. Requiring Further Evaluation/Follow Up POST HOSPITALIZATION/Incidental Findings:    Diet: cardiac diet    Activity: As tolerated    Instructions to Patient:     Discharge Medications:      Medication List      ASK your doctor about these medications    furosemide 20 MG tablet  Commonly known as: LASIX     omeprazole 20 MG delayed release capsule  Commonly known as: PRILOSEC     oxybutynin 10 MG extended release tablet  Commonly known as: DITROPAN-XL     potassium chloride 10 MEQ extended release tablet  Commonly known as: KLOR-CON M     sertraline 50 MG tablet  Commonly known as: ZOLOFT            Time Spent on discharge is  35 mins in patient examination, evaluation, counseling as well as medication reconciliation, prescriptions for required medications, discharge plan and follow up. Electronically signed by   Rostia Del Toro MD  7/13/2022  4:06 PM      Thank you Dr. Marichuy Aburto, APRN - CNP for the opportunity to be involved in this patient's care.

## 2022-07-13 NOTE — PROGRESS NOTES
Admission and assessment complete using VID. All questions answered and pt resting in bed with daughter visiting.

## 2022-07-13 NOTE — PROGRESS NOTES
All food and drink removed from pt bedside. Pt educated on importance on NPO prior to stress test in the morning.

## 2022-07-13 NOTE — PLAN OF CARE
Problem: Pain  Goal: Verbalizes/displays adequate comfort level or baseline comfort level  Outcome: Progressing  Flowsheets (Taken 7/13/2022 1732)  Verbalizes/displays adequate comfort level or baseline comfort level:   Encourage patient to monitor pain and request assistance   Assess pain using appropriate pain scale   Implement non-pharmacological measures as appropriate and evaluate response  Note: Patient had no complaints of pain during this shift.

## 2022-07-13 NOTE — CARE COORDINATION
CASE MANAGEMENT NOTE:    Admission Date:  7/12/2022 Mich Yang is a 48 y.o.  female    Admitted for : Chest pain [R07.9]  Chest pain, unspecified type [R07.9]    Met with:  Patient and Family    PCP:  Matthew Brasher                                 Insurance:  [facility]      Is patient alert and oriented at time of discussion:  Yes    Current Residence/ Living Arrangements:  independently at home             Current Services PTA:  No    Does patient go to outpatient dialysis: No  If yes, location and chair time:     Is patient agreeable to VNS: No    Freedom of choice provided:  NA    List of [address] provided: NA    VNS chosen:  No    DME:  none    Home Oxygen: No    Nebulizer: No    CPAP/BIPAP: No    Supplier: N/A    Potential Assistance Needed: No    SNF needed: No    Freedom of choice and list provided: No    Ph[facility]acy:  [facility]       Is patient currently receiving oral anticoagulation therapy? No    Is the Patient an [facility] with Readmission Risk Score greater than 14%? No  If yes, pt needs a follow up appointment made within 7 days. Family Members/Caregivers that pt would like involved in their care:    Yes    If yes, list name here:  Sister Jasmin Xiong    Transportation Provider:  Family             Discharge Plan:  7/13/22 - [facility] - Patient is from home. Her main language [place] , but can  Speak English too. As no DME's  ad denies need for VNS. Stress test today. Plan is to return home with no needs. Will follow . //pf                Electronically signed by: Krysta Luong RN on 7/13/2022 at 2:42 PM

## 2022-07-13 NOTE — DISCHARGE INSTR - DIET

## 2022-07-13 NOTE — PROGRESS NOTES
Patient is done with the stress part of her stress test, she is now allowed to eat. We will come up to inject her for the resting part of her test around 1400 and bring her down around 1430 for images. Any questions please [place] nuclear medicine at [phone].  Electronically signed by Gela Free Call on 7/13/2022 at 12:04 PM

## 2022-07-13 NOTE — PROGRESS NOTES
Patient given discharge instructions (in [facility] and [address] [facility]). Patient and family voiced understanding. Discharged to home with all belongings. Discharged with  and abulated to vehicle per self.

## 2022-07-13 NOTE — PROGRESS NOTES
Pt arrived to room 2061. Vital signs stable and pt oriented to the room. Daughter at bedside. Questions answered in regards to stress test tomorrow morning. [address] called for  as pt primarily speaks [address] [facility]. Admission and assessment to be completed once  arrives.

## 2022-07-14 NOTE — PROCEDURES
[address] [facility]                    [address] [facility]                              CARDIAC STRESS TEST    PATIENT NAME: Alec Nagy                        :        1975  MED REC NO:   [id number]                              ROOM:       2061  ACCOUNT NO:   [de-identified]                           ADMIT DATE: 2022  PROVIDER:     Bull Schultz    DATE OF STUDY:  2022    TEST TYPE: CARDIOLYTE TREADMILL STRESS TEST  INDICATION: CHEST PAIN  REFERRING PHYSICIAN: Victoria Pino CNP    100% MAX PREDICTED HEART RATE: 172 BEATS PER MINUTE  85% MAX PREDICTED HEART RATE: 146 BEATS PER MINUTE  RESTING HEART RATE: 67 BEATS PER MINUTE  MAXIMUM HEART RATE ACHIEVED: 155 BEATS PER MINUTE  PERCENTAGE OF AGE PREDICTED MAXIMUM ACHIEVED: 90%  RESTING BLOOD PRESSURE: 111/75  PEAK BLOOD PRESSURE: 170/63  PEAK DOUBLE PRODUCT: 87772  METS: 9.5    MEDICATION(S) GIVEN: NONE  REASON FOR TERMINATION: DYSPNEA, 85% OF MAXIMUM PREDICTED HEART RATE  ACHIEVED  TOTAL TIME ON TREADMILL: 7:40    RESTING EKG: NORMAL, SINUS RHYTHM, 67 BEATS PER MINUTE  STRESS HEART RESPONSE: NORMAL RESPONSE  BLOOD PRESSURE RESPONSE: APPROPRIATE  STRESS EKGs: NORMAL  CHEST DISCOMFORT: MILD CHEST PAIN/PRESSURE  ISCHEMIC EKG CHANGES: NONE    EKG IMPRESSION: ELECTROCARDIOGRAPHICALLY NEGATIVE TREADMILL STRESS TEST. RADIOISOTOPE RESULTS TO FOLLOW FROM DEPARTMENT OF NUCLEAR MEDICINE.     COMMENTS: EXERCISE TIME 7:40 Delisa Lentz    D: 2022 14:48:32       T: 2022 16:10:43     AS/JENNIFER  Job#: [id number]     Doc#: Unknown    CC:    (Retain this field even if not dictated or not decipherable)

## 2022-08-12 PROBLEM — Z13.6 TREADMILL STRESS TEST NEGATIVE FOR ANGINA PECTORIS: Status: RESOLVED | Noted: 2022-07-13 | Resolved: 2022-08-12

## 2023-03-31 RX ORDER — SERTRALINE HYDROCHLORIDE 100 MG/1
TABLET, FILM COATED ORAL
Qty: 30 TABLET | Refills: 0 | OUTPATIENT
Start: 2023-03-31